# Patient Record
Sex: MALE | Race: OTHER | NOT HISPANIC OR LATINO | URBAN - METROPOLITAN AREA
[De-identification: names, ages, dates, MRNs, and addresses within clinical notes are randomized per-mention and may not be internally consistent; named-entity substitution may affect disease eponyms.]

---

## 2022-07-01 NOTE — PATIENT DISCUSSION
Patient states the vision in the right eye has been very blurred for years. No BDR OU, the patient has overall healthy eyes.

## 2022-10-25 ENCOUNTER — COMPREHENSIVE EXAM (OUTPATIENT)
Dept: URBAN - METROPOLITAN AREA CLINIC 16 | Facility: CLINIC | Age: 67
End: 2022-10-25

## 2022-10-25 DIAGNOSIS — H40.1131: ICD-10-CM

## 2022-10-25 DIAGNOSIS — H52.4: ICD-10-CM

## 2022-10-25 DIAGNOSIS — H52.03: ICD-10-CM

## 2022-10-25 DIAGNOSIS — Z01.00: ICD-10-CM

## 2022-10-25 DIAGNOSIS — H43.393: ICD-10-CM

## 2022-10-25 DIAGNOSIS — H25.13: ICD-10-CM

## 2022-10-25 DIAGNOSIS — H52.223: ICD-10-CM

## 2022-10-25 PROCEDURE — 92014 COMPRE OPH EXAM EST PT 1/>: CPT

## 2022-10-25 PROCEDURE — 92015 DETERMINE REFRACTIVE STATE: CPT

## 2022-10-25 ASSESSMENT — TONOMETRY
OD_IOP_MMHG: 24
OS_IOP_MMHG: 23

## 2022-10-25 ASSESSMENT — KERATOMETRY
OD_K2POWER_DIOPTERS: 44.00
OS_AXISANGLE2_DEGREES: 159
OD_AXISANGLE_DEGREES: 112
OS_K2POWER_DIOPTERS: 44.25
OS_K1POWER_DIOPTERS: 43.50
OS_AXISANGLE_DEGREES: 69
OD_AXISANGLE2_DEGREES: 22
OD_K1POWER_DIOPTERS: 43.00

## 2022-11-10 ENCOUNTER — DIAGNOSTICS ONLY (OUTPATIENT)
Dept: URBAN - METROPOLITAN AREA CLINIC 16 | Facility: CLINIC | Age: 67
End: 2022-11-10

## 2022-11-10 DIAGNOSIS — H40.1131: ICD-10-CM

## 2022-11-10 PROCEDURE — 92083 EXTENDED VISUAL FIELD XM: CPT

## 2022-11-10 PROCEDURE — 99213 OFFICE O/P EST LOW 20 MIN: CPT

## 2022-11-10 PROCEDURE — 92133 CPTRZD OPH DX IMG PST SGM ON: CPT

## 2022-11-10 ASSESSMENT — TONOMETRY
OS_IOP_MMHG: 18
OD_IOP_MMHG: 21

## 2022-11-10 ASSESSMENT — KERATOMETRY
OS_K2POWER_DIOPTERS: 44.25
OS_AXISANGLE2_DEGREES: 159
OS_K1POWER_DIOPTERS: 43.50
OS_AXISANGLE_DEGREES: 69
OD_AXISANGLE2_DEGREES: 22
OD_K1POWER_DIOPTERS: 43.00
OD_AXISANGLE_DEGREES: 112
OD_K2POWER_DIOPTERS: 44.00

## 2023-09-29 ASSESSMENT — KERATOMETRY
OD_K2POWER_DIOPTERS: 44.00
OS_K2POWER_DIOPTERS: 44.25
OS_AXISANGLE2_DEGREES: 159
OD_AXISANGLE2_DEGREES: 22
OD_K1POWER_DIOPTERS: 43.00
OS_K1POWER_DIOPTERS: 43.50
OS_AXISANGLE_DEGREES: 69
OD_AXISANGLE_DEGREES: 112

## 2023-10-06 ENCOUNTER — COMPREHENSIVE EXAM (OUTPATIENT)
Dept: URBAN - METROPOLITAN AREA CLINIC 16 | Facility: CLINIC | Age: 68
End: 2023-10-06

## 2023-10-06 DIAGNOSIS — H52.4: ICD-10-CM

## 2023-10-06 DIAGNOSIS — H52.223: ICD-10-CM

## 2023-10-06 DIAGNOSIS — Z01.00: ICD-10-CM

## 2023-10-06 DIAGNOSIS — H25.13: ICD-10-CM

## 2023-10-06 DIAGNOSIS — H40.1131: ICD-10-CM

## 2023-10-06 PROCEDURE — 92014 COMPRE OPH EXAM EST PT 1/>: CPT

## 2023-10-06 PROCEDURE — 92015 DETERMINE REFRACTIVE STATE: CPT

## 2023-10-06 ASSESSMENT — TONOMETRY
OD_IOP_MMHG: 15
OS_IOP_MMHG: 16

## 2023-11-28 ENCOUNTER — DIAGNOSTICS ONLY (OUTPATIENT)
Dept: URBAN - METROPOLITAN AREA CLINIC 16 | Facility: CLINIC | Age: 68
End: 2023-11-28

## 2023-11-28 DIAGNOSIS — H40.1131: ICD-10-CM

## 2023-11-28 PROCEDURE — 92133 CPTRZD OPH DX IMG PST SGM ON: CPT

## 2023-11-28 PROCEDURE — 92083 EXTENDED VISUAL FIELD XM: CPT

## 2023-11-28 ASSESSMENT — KERATOMETRY
OS_K1POWER_DIOPTERS: 43.50
OS_AXISANGLE_DEGREES: 69
OS_K2POWER_DIOPTERS: 44.25
OS_AXISANGLE2_DEGREES: 159
OD_AXISANGLE_DEGREES: 112
OD_AXISANGLE2_DEGREES: 22
OD_K1POWER_DIOPTERS: 43.00
OD_K2POWER_DIOPTERS: 44.00

## 2023-11-28 ASSESSMENT — TONOMETRY
OS_IOP_MMHG: 18
OD_IOP_MMHG: 16

## 2024-09-25 ENCOUNTER — COMPREHENSIVE EXAM (OUTPATIENT)
Dept: URBAN - METROPOLITAN AREA CLINIC 16 | Facility: CLINIC | Age: 69
End: 2024-09-25

## 2024-09-25 DIAGNOSIS — H52.4: ICD-10-CM

## 2024-09-25 DIAGNOSIS — H25.13: ICD-10-CM

## 2024-09-25 DIAGNOSIS — H52.223: ICD-10-CM

## 2024-09-25 DIAGNOSIS — H40.1131: ICD-10-CM

## 2024-09-25 PROCEDURE — 92015 DETERMINE REFRACTIVE STATE: CPT

## 2024-09-25 PROCEDURE — 92014 COMPRE OPH EXAM EST PT 1/>: CPT

## 2024-11-15 ENCOUNTER — DIAGNOSTICS ONLY (OUTPATIENT)
Dept: URBAN - METROPOLITAN AREA CLINIC 16 | Facility: CLINIC | Age: 69
End: 2024-11-15

## 2024-11-15 DIAGNOSIS — H40.1131: ICD-10-CM

## 2024-11-15 PROCEDURE — 92133 CPTRZD OPH DX IMG PST SGM ON: CPT

## 2024-11-15 PROCEDURE — 92083 EXTENDED VISUAL FIELD XM: CPT
